# Patient Record
Sex: MALE | Race: WHITE | NOT HISPANIC OR LATINO | Employment: OTHER | URBAN - METROPOLITAN AREA
[De-identification: names, ages, dates, MRNs, and addresses within clinical notes are randomized per-mention and may not be internally consistent; named-entity substitution may affect disease eponyms.]

---

## 2019-08-29 ENCOUNTER — COMMUNICATION - HEALTHEAST (OUTPATIENT)
Dept: SCHEDULING | Facility: CLINIC | Age: 64
End: 2019-08-29

## 2020-12-13 ENCOUNTER — ANESTHESIA - HEALTHEAST (OUTPATIENT)
Dept: SURGERY | Facility: CLINIC | Age: 65
End: 2020-12-13

## 2020-12-14 ENCOUNTER — COMMUNICATION - HEALTHEAST (OUTPATIENT)
Dept: SCHEDULING | Facility: CLINIC | Age: 65
End: 2020-12-14

## 2020-12-14 ENCOUNTER — SURGERY - HEALTHEAST (OUTPATIENT)
Dept: SURGERY | Facility: CLINIC | Age: 65
End: 2020-12-14

## 2020-12-15 ASSESSMENT — MIFFLIN-ST. JEOR: SCORE: 2100.53

## 2020-12-16 ENCOUNTER — COMMUNICATION - HEALTHEAST (OUTPATIENT)
Dept: SURGERY | Facility: CLINIC | Age: 65
End: 2020-12-16

## 2021-05-26 ENCOUNTER — RECORDS - HEALTHEAST (OUTPATIENT)
Dept: ADMINISTRATIVE | Facility: CLINIC | Age: 66
End: 2021-05-26

## 2021-05-28 ENCOUNTER — RECORDS - HEALTHEAST (OUTPATIENT)
Dept: ADMINISTRATIVE | Facility: CLINIC | Age: 66
End: 2021-05-28

## 2021-05-31 NOTE — TELEPHONE ENCOUNTER
Was seen in the ER for cellulitis on 8/16/19.    [facility] and [facility] told that his white count is still high.    Pt is hoping that the ER provider could just refill his antibiotic.    Advised that he needs to be seen either by his pcp or in walk in care to another evaluation of the foot to see if the foot is improving and if he needs a refill of the medications. ER providers do not refill medications over the phone.    Pt give options of walk in care if he is not able to get in with his pcp.    Hiwot Taveras RN   [facility] Medication Refill and Triage Nurse  9:25 AM  8/29/2019    Reason for Disposition    Information only question and nurse able to answer    Protocols used: NO PROTOCOL AVAILABLE - INFORMATION ONLY-A-[place]

## 2021-06-05 VITALS — HEIGHT: 71 IN | WEIGHT: 290.2 LBS | BODY MASS INDEX: 40.63 KG/M2

## 2021-06-13 NOTE — ANESTHESIA POSTPROCEDURE EVALUATION
Patient: Lawrence A Mendel  Procedure(s):  APPENDECTOMY, LAPAROSCOPIC  Anesthesia type: general    Patient location: PACU  Last vitals:   Vitals Value Taken Time   /81 12/14/20 1220   Temp 36.8  C (98.2  F) 12/14/20 1220   Pulse 64 12/14/20 1228   Resp 24 12/14/20 1228   SpO2 94 % 12/14/20 1228   Vitals shown include unvalidated device data.  Post vital signs: stable  Level of consciousness: awake and responds to simple questions  Post-anesthesia pain: pain controlled  Post-anesthesia nausea and vomiting: no  Pulmonary: unassisted, return to baseline  Cardiovascular: stable and blood pressure at baseline  Hydration: adequate  Anesthetic events: no    QCDR Measures:  ASA# 11 - Delores-op Cardiac Arrest: ASA11B - Patient did NOT experience unanticipated cardiac arrest  ASA# 12 - Delores-op Mortality Rate: ASA12B - Patient did NOT die  ASA# 13 - PACU Re-Intubation Rate: ASA13B - Patient did NOT require a new airway mgmt  ASA# 10 - Composite Anes Safety: ASA10A - No serious adverse event    Additional Notes:

## 2021-06-13 NOTE — ANESTHESIA PREPROCEDURE EVALUATION
Anesthesia Evaluation      Patient summary reviewed     Airway   Mallampati: IV  Neck ROM: full   Pulmonary     breath sounds clear to auscultation  (+) sleep apnea on CPAP, , a smoker                         Cardiovascular   Exercise tolerance: > or = 4 METS  (+) hypertension, CAD, CABG/stent (CABG 2003, Stents x 2 2005), , hypercholesterolemia,     (-) angina  Rhythm: regular   murmur      Neuro/Psych    (+) depression,     Endo/Other    (+) arthritis, obesity (Morbid),      Comments: Hx of colon CA - chemo 24 yrs ago    GI/Hepatic/Renal    (+) GERD,        Other findings:     NPO > 8 hrs     Results for MENDEL, LAWRENCE A (MRN [id number]) as of 12/14/2020 12/13/2020 13:32  SARS-CoV-2 PCR Result: NEGATIVE          Results for MENDEL, LAWRENCE A (MRN [id number]) as of 12/14/2020 12/13/2020 11:05  Sodium: 137  Potassium: 4.3  Chloride: 106  CO2: 21 (L)  Anion Gap, Calculation: 10  BUN: 14  Creatinine: 0.84  GFR MDRD Af Amer: >60  GFR MDRD Non Af Amer: >60  Calcium: 8.7  AST: 22  ALT: 20  ALBUMIN: 3.9  Protein, Total: 6.8  Alkaline Phosphatase: 59  Bilirubin, Total: 0.9  Magnesium: 2.3  Troponin I: <0.01  Glucose: 117  Lipase: 13          Results for MENDEL, LAWRENCE A (MRN [id number]) as of 12/14/2020 12/14/2020 06:37  WBC: 12.0 (H)  RBC: 4.29 (L)  Hemoglobin: 13.8 (L)  Hematocrit: 41.8  MCV: 97  MCH: 32.2  MCHC: 33.0  RDW: 15.1 (H)  Platelets: 146  MPV: 10.1  Neutrophils %: 77 (H)  Lymphocytes %: 12 (L)  Monocytes %: 11 (H)  Eosinophils %: 1  Basophils %: 0  Neutrophils Absolute: 9.2 (H)        Dental    (+) poor dentition                       Anesthesia Plan  Planned anesthetic: general endotracheal      Ketamine  Glidescope  Esmolol  Robinul  Zofran, decadron 10 mg    ASA 3 - emergent   Induction: intravenous   Anesthetic plan and risks discussed with: patient  Anesthesia plan special considerations: video-assisted, antiemetics,   Post-op plan: routine recovery

## 2021-06-13 NOTE — TELEPHONE ENCOUNTER
Received voicemail from pt with post op questions. Attempted to call back again, but received voicemail. Left another message for a return call at my direct number.

## 2021-06-13 NOTE — ANESTHESIA CARE TRANSFER NOTE
Last vitals:   Vitals:    12/14/20 1137   BP: 150/85   Pulse: 71   Resp: 16   Temp: 36.6  C (97.9  F)   SpO2: 92%     Patient's level of consciousness is drowsy  Spontaneous respirations: yes  Maintains airway independently: yes  Dentition unchanged: yes  Oropharynx: oropharynx clear of all foreign objects    QCDR Measures:  ASA# 20 - Surgical Safety Checklist: WHO surgical safety checklist completed prior to induction    PQRS# 430 - Adult PONV Prevention: 4558F - Pt received => 2 anti-emetic agents (different classes) preop & intraop  ASA# 8 - Peds PONV Prevention: NA - Not pediatric patient, not GA or 2 or more risk factors NOT present  PQRS# 424 - Delores-op Temp Management: 4559F - At least one body temp DOCUMENTED => 35.5C or 95.9F within required timeframe  PQRS# 426 - PACU Transfer Protocol: - Transfer of care checklist used  ASA# 14 - Acute Post-op Pain: ASA14B - Patient did NOT experience pain >= 7 out of 10

## 2021-06-16 PROBLEM — E78.00 HYPERCHOLESTEROLEMIA: Status: ACTIVE | Noted: 2020-12-13

## 2021-06-16 PROBLEM — J30.9 ALLERGIC RHINITIS: Status: ACTIVE | Noted: 2020-12-13

## 2021-06-16 PROBLEM — E66.9 OBESITY: Status: ACTIVE | Noted: 2020-12-13

## 2021-06-16 PROBLEM — I10 HYPERTENSION: Status: ACTIVE | Noted: 2020-12-13

## 2021-06-16 PROBLEM — R73.03 PREDIABETES: Status: ACTIVE | Noted: 2020-12-13

## 2021-06-16 PROBLEM — K21.9 GASTROESOPHAGEAL REFLUX DISEASE: Status: ACTIVE | Noted: 2020-12-13

## 2021-06-16 PROBLEM — F32.5 MAJOR DEPRESSION IN REMISSION (H): Status: ACTIVE | Noted: 2020-12-13

## 2021-06-16 PROBLEM — G47.30 SLEEP APNEA: Status: ACTIVE | Noted: 2020-12-13

## 2021-06-16 PROBLEM — K35.209 ACUTE APPENDICITIS WITH GENERALIZED PERITONITIS, WITHOUT GANGRENE OR ABSCESS, UNSPECIFIED WHETHER PERFORATION PRESENT: Status: ACTIVE | Noted: 2020-12-13

## 2021-06-16 PROBLEM — F17.200 TOBACCO USE DISORDER: Status: ACTIVE | Noted: 2020-12-13

## 2021-06-16 PROBLEM — F10.20 OTHER AND UNSPECIFIED ALCOHOL DEPENDENCE, UNSPECIFIED DRINKING BEHAVIOR: Status: ACTIVE | Noted: 2020-12-13

## 2021-06-16 PROBLEM — K37 APPENDICITIS: Status: ACTIVE | Noted: 2020-12-13

## 2022-08-22 ENCOUNTER — HOSPITAL ENCOUNTER (EMERGENCY)
Facility: CLINIC | Age: 67
Discharge: HOME | End: 2022-08-22
Attending: EMERGENCY MEDICINE | Admitting: EMERGENCY MEDICINE
Payer: MEDICARE

## 2022-08-22 VITALS
HEART RATE: 81 BPM | BODY MASS INDEX: 37.22 KG/M2 | SYSTOLIC BLOOD PRESSURE: 112 MMHG | RESPIRATION RATE: 18 BRPM | HEIGHT: 70 IN | OXYGEN SATURATION: 95 % | TEMPERATURE: 98.7 F | WEIGHT: 260 LBS | DIASTOLIC BLOOD PRESSURE: 69 MMHG

## 2022-08-22 DIAGNOSIS — L03.116 LEFT LEG CELLULITIS: ICD-10-CM

## 2022-08-22 LAB
ANION GAP SERPL CALCULATED.3IONS-SCNC: 9 MMOL/L (ref 5–18)
BASOPHILS # BLD AUTO: 0.1 10E3/UL (ref 0–0.2)
BASOPHILS NFR BLD AUTO: 0 %
BUN SERPL-MCNC: 14 MG/DL (ref 8–22)
C REACTIVE PROTEIN LHE: 6.1 MG/DL (ref 0–?)
CALCIUM SERPL-MCNC: 9.9 MG/DL (ref 8.5–10.5)
CHLORIDE BLD-SCNC: 104 MMOL/L (ref 98–107)
CO2 SERPL-SCNC: 23 MMOL/L (ref 22–31)
CREAT SERPL-MCNC: 1 MG/DL (ref 0.7–1.3)
EOSINOPHIL # BLD AUTO: 0.1 10E3/UL (ref 0–0.7)
EOSINOPHIL NFR BLD AUTO: 0 %
ERYTHROCYTE [DISTWIDTH] IN BLOOD BY AUTOMATED COUNT: 14.7 % (ref 10–15)
GFR SERPL CREATININE-BSD FRML MDRD: 81 ML/MIN/1.73M2
GLUCOSE BLD-MCNC: 111 MG/DL (ref 70–125)
HCT VFR BLD AUTO: 46.2 % (ref 40–53)
HGB BLD-MCNC: 15.3 G/DL (ref 13.3–17.7)
IMM GRANULOCYTES # BLD: 0.1 10E3/UL
IMM GRANULOCYTES NFR BLD: 1 %
LACTATE SERPL-SCNC: 0.6 MMOL/L (ref 0.7–2)
LYMPHOCYTES # BLD AUTO: 1.3 10E3/UL (ref 0.8–5.3)
LYMPHOCYTES NFR BLD AUTO: 8 %
MCH RBC QN AUTO: 32.3 PG (ref 26.5–33)
MCHC RBC AUTO-ENTMCNC: 33.1 G/DL (ref 31.5–36.5)
MCV RBC AUTO: 98 FL (ref 78–100)
MONOCYTES # BLD AUTO: 1 10E3/UL (ref 0–1.3)
MONOCYTES NFR BLD AUTO: 7 %
NEUTROPHILS # BLD AUTO: 12.5 10E3/UL (ref 1.6–8.3)
NEUTROPHILS NFR BLD AUTO: 84 %
NRBC # BLD AUTO: 0 10E3/UL
NRBC BLD AUTO-RTO: 0 /100
PLATELET # BLD AUTO: 162 10E3/UL (ref 150–450)
POTASSIUM BLD-SCNC: 4.5 MMOL/L (ref 3.5–5)
RBC # BLD AUTO: 4.74 10E6/UL (ref 4.4–5.9)
SODIUM SERPL-SCNC: 136 MMOL/L (ref 136–145)
WBC # BLD AUTO: 15 10E3/UL (ref 4–11)

## 2022-08-22 PROCEDURE — 86140 C-REACTIVE PROTEIN: CPT | Performed by: EMERGENCY MEDICINE

## 2022-08-22 PROCEDURE — 83605 ASSAY OF LACTIC ACID: CPT | Performed by: EMERGENCY MEDICINE

## 2022-08-22 PROCEDURE — 36415 COLL VENOUS BLD VENIPUNCTURE: CPT | Performed by: EMERGENCY MEDICINE

## 2022-08-22 PROCEDURE — 99283 EMERGENCY DEPT VISIT LOW MDM: CPT

## 2022-08-22 PROCEDURE — 80048 BASIC METABOLIC PNL TOTAL CA: CPT | Performed by: EMERGENCY MEDICINE

## 2022-08-22 PROCEDURE — 85025 COMPLETE CBC W/AUTO DIFF WBC: CPT | Performed by: EMERGENCY MEDICINE

## 2022-08-22 RX ORDER — CEPHALEXIN 500 MG/1
500 CAPSULE ORAL 2 TIMES DAILY
Qty: 20 CAPSULE | Refills: 0 | Status: SHIPPED | OUTPATIENT
Start: 2022-08-22 | End: 2022-09-01

## 2022-08-23 NOTE — ED NOTES
Pt not seen in [facility] at this time. Pt appears to have left the ED prior to receiving discharge instructions.

## 2022-08-23 NOTE — ED PROVIDER NOTES
EMERGENCY DEPARTMENT ENCOUnter      NAME: Lawrence A Mendel  AGE: 67 year old male  YOB: 1955  MRN: [id number]  EVALUATION DATE & TIME: No admission date for patient encounter.    PCP: No primary care provider on file.    ED PROVIDER: Cullen Holliday DO      Chief Complaint   Patient presents with     Foot Pain     Cellulitis         FINAL IMPRESSION:  1. Left leg cellulitis          ED COURSE & MEDICAL DECISION MAKIN:20 PM I met the patient and performed my initial interview and exam.      The patient presented to the emergency department today with complaints of redness over the lower aspect of the left leg involving the left foot.  He has a history of similar symptoms due to cellulitis.  Physical exam findings are consistent with cellulitis tonight.  Given his otherwise well appearance I feel that he can be safely discharged home with oral antibiotics.  I have instructed him to return if his redness worsens as he may then require IV antibiotics and admission.  He is comfortable with this plan.      At the conclusion of the encounter I discussed the results of all of the tests and the disposition. The questions were answered. The patient or family acknowledged understanding and was agreeable with the care plan.       NEW PRESCRIPTIONS STARTED AT TODAY'S ER VISIT  Discharge Medication List as of 2022  8:01 PM      START taking these medications    Details   cephALEXin (KEFLEX) 500 MG capsule Take 1 capsule (500 mg) by mouth 2 times daily for 10 days, Disp-20 capsule, R-0, E-Prescribe                =================================================================    HPI        Lawrence A Mendel is a 67 year old male with a pertinent history of hypertension who presents to this ED by walk in for evaluation of foot pain.    Patient reports that he woke up this morning with swelling, redness, warmth, and pain to his left foot into his calf. Notes he had this same thing three years ago and was  diagnosed with cellulitis and went home on antibiotics. Patient notes he was out gardening yesterday. No medication allergies. Patient denies any other complaints at this time.       REVIEW OF SYSTEMS     Constitutional:  Denies fever or chills  HENT:  Denies sore throat   Respiratory:  Denies cough or shortness of breath   Cardiovascular:  Denies chest pain or palpitations  GI:  Denies abdominal pain, nausea, or vomiting  Musculoskeletal:  Denies any new extremity pain   Skin: Positive for swelling, redness, and warmth to left lower extremity and foot. Denies rash   Neurologic:  Denies headache, focal weakness or sensory changes    All other systems reviewed and are negative      PAST MEDICAL HISTORY:  Past Medical History:   Diagnosis Date     H/O heart bypass surgery        PAST SURGICAL HISTORY:  Past Surgical History:   Procedure Laterality Date     ID LAP,APPENDECTOMY N/A 12/14/2020    Procedure: APPENDECTOMY, LAPAROSCOPIC;  Surgeon: Luann Morrison MD;  Location: [facility];  Service: General           CURRENT MEDICATIONS:    cephALEXin (KEFLEX) 500 MG capsule  ammonium lactate (LAC-HYDRIN) 12 % lotion  aspirin 81 MG EC tablet  CHOLECALCIFEROL, VITAMIN D3, ORAL  COQ10, UBIQUINOL, ORAL  cyanocobalamin, vitamin B-12, (VITAMIN B-12 ORAL)  fluticasone propionate (FLONASE) 50 mcg/actuation nasal spray  HYDROcodone-acetaminophen 5-325 mg per tablet  lisinopriL (PRINIVIL,ZESTRIL) 40 MG tablet  metoprolol tartrate (LOPRESSOR) 50 MG tablet  multivitamin therapeutic tablet  polyethylene glycol (MIRALAX) 17 gram packet  pyridoxine HCl, vitamin B6, (VITAMIN B-6 ORAL)  simvastatin (ZOCOR) 80 MG tablet  triamcinolone (KENALOG) 0.1 % cream        ALLERGIES:  No Known Allergies    FAMILY HISTORY:  History reviewed. No pertinent family history.    SOCIAL HISTORY:   Social History     Socioeconomic History     Marital status:      Spouse name: None     Number of children: None     Years of education: None  "    Highest education level: None   Tobacco Use     Smoking status: Current Every Day Smoker     Packs/day: 0.25     Smokeless tobacco: Never Used   Substance and Sexual Activity     Alcohol use: Never     Drug use: Never       VITALS:  Patient Vitals for the past 24 hrs:   BP Temp Temp src Pulse Resp SpO2 Height Weight   08/22/22 1540 112/69 98.7  F (37.1  C) Oral 81 18 95 % 1.778 m (5' 10\") 117.9 kg (260 lb)       PHYSICAL EXAM    Constitutional:  Well developed, Well nourished. Overweight male.  HENT:  Normocephalic, Atraumatic, Bilateral external ears normal, Oropharynx moist, Nose normal.   Neck:  Normal range of motion, No meningismus, No stridor.   Eyes:  EOMI, Conjunctiva normal, No discharge.   Respiratory:  Normal breath sounds, No respiratory distress, No wheezing, No chest tenderness.   Musculoskeletal:  No tenderness to palpation or major deformities noted.   Integument:  Dry. Erythema and warmth through left foot into mid calf. No breaks in skin.  Neurologic:  Alert & oriented x 3, Normal motor function, Normal sensory function, No focal deficits noted.   Psychiatric:  Affect normal, Judgment normal, Mood normal.      LAB:  All pertinent labs reviewed and interpreted.  Results for orders placed or performed during the hospital encounter of 08/22/22   Basic metabolic panel   Result Value Ref Range    Sodium 136 136 - 145 mmol/L    Potassium 4.5 3.5 - 5.0 mmol/L    Chloride 104 98 - 107 mmol/L    Carbon Dioxide (CO2) 23 22 - 31 mmol/L    Anion Gap 9 5 - 18 mmol/L    Urea Nitrogen 14 8 - 22 mg/dL    Creatinine 1.00 0.70 - 1.30 mg/dL    Calcium 9.9 8.5 - 10.5 mg/dL    Glucose 111 70 - 125 mg/dL    GFR Estimate 81 >60 mL/min/1.73m2   CRP inflammation   Result Value Ref Range    CRP 6.1 (H) 0.0 - <0.8 mg/dL   Lactic acid whole blood   Result Value Ref Range    Lactic Acid 0.6 (L) 0.7 - 2.0 mmol/L   CBC with platelets and differential   Result Value Ref Range    WBC Count 15.0 (H) 4.0 - 11.0 10e3/uL    RBC " Count 4.74 4.40 - 5.90 10e6/uL    Hemoglobin 15.3 13.3 - 17.7 g/dL    Hematocrit 46.2 40.0 - 53.0 %    MCV 98 78 - 100 fL    MCH 32.3 26.5 - 33.0 pg    MCHC 33.1 31.5 - 36.5 g/dL    RDW 14.7 10.0 - 15.0 %    Platelet Count 162 150 - 450 10e3/uL    % Neutrophils 84 %    % Lymphocytes 8 %    % Monocytes 7 %    % Eosinophils 0 %    % Basophils 0 %    % Immature Granulocytes 1 %    NRBCs per 100 WBC 0 <1 /100    Absolute Neutrophils 12.5 (H) 1.6 - 8.3 10e3/uL    Absolute Lymphocytes 1.3 0.8 - 5.3 10e3/uL    Absolute Monocytes 1.0 0.0 - 1.3 10e3/uL    Absolute Eosinophils 0.1 0.0 - 0.7 10e3/uL    Absolute Basophils 0.1 0.0 - 0.2 10e3/uL    Absolute Immature Granulocytes 0.1 <=0.4 10e3/uL    Absolute NRBCs 0.0 10e3/uL         I, Lonnie Pham, am serving as a scribe to document services personally performed by Dr. Holliday based on my observation and the provider's statements to me. I, Cullen Holliday, DO attest that Lonnie Pham is acting in a scribe capacity, has observed my performance of the services and has documented them in accordance with my direction.    Cullen Holliday, DO  Emergency Medicine  [facility] EMERGENCY ROOM  [address] [address] [phone]  Dept: [phone]     Cullen Holliday MD  08/23/22 0002

## 2022-08-23 NOTE — DISCHARGE INSTRUCTIONS
Your symptoms today appear to be due to cellulitis.  Take the prescribed antibiotics as directed and follow-up closely with your primary care doctor.  Return to the emergency department if you develop any worsening symptoms or if you have any other concerns.

## 2022-08-23 NOTE — ED NOTES
Bed: [facility]02  Expected date:   Expected time:   Means of arrival:   Comments:  Hold for provider luis

## 2022-08-30 ENCOUNTER — TELEPHONE (OUTPATIENT)
Dept: FAMILY MEDICINE | Facility: CLINIC | Age: 67
End: 2022-08-30

## 2022-08-30 NOTE — TELEPHONE ENCOUNTER
Reason for Call:  Other appointment    Detailed comments: Manny (pt's wife) is calling to set up an appointment with Stan Merrill for her , John, who would be a new pt of [place]. They would like to establish care with him and also do an er follow up from his recent visit at [facility] for cellulitis on 8/22. Unfortunately, the only available appointments for Stan won't be for another few weeks, and they would like to get John seen as soon as possible, as he will need more medication for his ceullulitis.    Phone Number Patient can be reached at: Home number on file [phone] (home)    Best Time: Anytime    Can we leave a detailed message on this number? YES    Call taken on 8/30/2022 at 4:12 PM by Rhona Lucas